# Patient Record
Sex: FEMALE | Race: WHITE | NOT HISPANIC OR LATINO | Employment: FULL TIME
[De-identification: names, ages, dates, MRNs, and addresses within clinical notes are randomized per-mention and may not be internally consistent; named-entity substitution may affect disease eponyms.]

---

## 2019-01-26 ENCOUNTER — TELEPHONE (OUTPATIENT)
Dept: SCHEDULING | Age: 49
End: 2019-01-26

## 2019-01-28 ENCOUNTER — IMAGING SERVICES (OUTPATIENT)
Dept: GENERAL RADIOLOGY | Age: 49
End: 2019-01-28

## 2019-01-28 PROCEDURE — X1094 NO CHARGE VISIT: HCPCS | Performed by: FAMILY MEDICINE

## 2019-02-05 PROBLEM — S52.551A CLOSED EXTRAARTICULAR FRACTURE OF DISTAL END OF RIGHT RADIUS: Status: ACTIVE | Noted: 2019-02-05

## 2019-03-01 PROBLEM — M25.631 LIMITATION OF JOINT MOTION OF WRIST, RIGHT: Status: ACTIVE | Noted: 2019-03-01

## 2021-06-21 ENCOUNTER — OFFICE VISIT (OUTPATIENT)
Dept: FAMILY MEDICINE CLINIC | Facility: CLINIC | Age: 51
End: 2021-06-21
Payer: COMMERCIAL

## 2021-06-21 VITALS
RESPIRATION RATE: 16 BRPM | HEIGHT: 67.5 IN | HEART RATE: 82 BPM | WEIGHT: 182 LBS | TEMPERATURE: 98 F | BODY MASS INDEX: 28.23 KG/M2 | SYSTOLIC BLOOD PRESSURE: 114 MMHG | DIASTOLIC BLOOD PRESSURE: 72 MMHG

## 2021-06-21 DIAGNOSIS — E55.9 VITAMIN D DEFICIENCY: ICD-10-CM

## 2021-06-21 DIAGNOSIS — Z00.00 ANNUAL PHYSICAL EXAM: Primary | ICD-10-CM

## 2021-06-21 DIAGNOSIS — Z12.31 BREAST CANCER SCREENING BY MAMMOGRAM: ICD-10-CM

## 2021-06-21 DIAGNOSIS — L60.8 CHANGE IN NAIL APPEARANCE: ICD-10-CM

## 2021-06-21 DIAGNOSIS — R21 RASH: ICD-10-CM

## 2021-06-21 DIAGNOSIS — Z87.42 HISTORY OF ABNORMAL CERVICAL PAP SMEAR: ICD-10-CM

## 2021-06-21 DIAGNOSIS — Z13.31 NEGATIVE DEPRESSION SCREENING: ICD-10-CM

## 2021-06-21 PROBLEM — S52.551A CLOSED EXTRAARTICULAR FRACTURE OF DISTAL END OF RIGHT RADIUS: Status: RESOLVED | Noted: 2019-02-05 | Resolved: 2021-06-21

## 2021-06-21 PROBLEM — M25.631 LIMITATION OF JOINT MOTION OF WRIST, RIGHT: Status: RESOLVED | Noted: 2019-03-01 | Resolved: 2021-06-21

## 2021-06-21 PROCEDURE — 3008F BODY MASS INDEX DOCD: CPT | Performed by: FAMILY MEDICINE

## 2021-06-21 PROCEDURE — 3074F SYST BP LT 130 MM HG: CPT | Performed by: FAMILY MEDICINE

## 2021-06-21 PROCEDURE — 99214 OFFICE O/P EST MOD 30 MIN: CPT | Performed by: FAMILY MEDICINE

## 2021-06-21 PROCEDURE — 99386 PREV VISIT NEW AGE 40-64: CPT | Performed by: FAMILY MEDICINE

## 2021-06-21 PROCEDURE — 3078F DIAST BP <80 MM HG: CPT | Performed by: FAMILY MEDICINE

## 2021-06-21 RX ORDER — PHENTERMINE HYDROCHLORIDE 37.5 MG/1
37.5 TABLET ORAL DAILY
COMMUNITY
Start: 2021-05-08

## 2021-06-21 NOTE — PROGRESS NOTES
Faiza Christy is a 52year old female that presents for annual physical exam.     Patient presents with:   Annual: routine physical, pend labs to [facility]. Due for TDAP vaccine  Gyn Exam: Dr. Sang Pierce does paps, 07/21/2021  Breast Problem: Lump o Number of children: Not on file      Years of education: Not on file      Highest education level: Not on file    Occupational History      Not on file    Tobacco Use      Smoking status: Never Smoker      Smokeless tobacco: Never Used    Substance and Sex 114/72   Pulse 82   Temp 98 °F (36.7 °C) (Temporal)   Resp 16   Ht 5' 7.5\" (1.715 m)   Wt 182 lb (82.6 kg)   LMP 06/07/2021   BMI 28.08 kg/m²  Estimated body mass index is 28.08 kg/m² as calculated from the following:    Height as of this encounter: 5' 7. Risks, benefits, and alternatives of current treatment plan discussed in detail. Questions and concerns addressed. Red flags to RTC or ED reviewed. Patient (or parent) agrees to plan.       Return in about 1 year (around 6/21/2022) for annual exam, or

## 2021-06-21 NOTE — PATIENT INSTRUCTIONS
Thank you for allowing me to participate in your care today. I will contact you with any results from today's visit. Lab results are typically available in 2-3 days for blood tests, and 3-5 days for any cultures or Paps.    Please let me know if you hav mammograms. 3    Cervical cancer All women in this age group, except women who have had a complete hysterectomy Pap test every 3 years or Pap test plus human papilloma virus (HPV) test every 5 years   Chlamydia Women at increased risk for infection [facility] Women at increased risk 1 to 3 doses   Influenza (flu) All women in this age group Once a year   Measles, mumps, rubella (MMR) All women in this age group who have no record of these infections or vaccines 1 or 2 doses   Meningococcal Women at increased ri

## 2021-07-13 LAB
ABSOLUTE BASOPHILS: 40 CELLS/UL (ref 0–200)
ABSOLUTE EOSINOPHILS: 110 CELLS/UL (ref 15–500)
ABSOLUTE LYMPHOCYTES: 1355 CELLS/UL (ref 850–3900)
ABSOLUTE MONOCYTES: 321 CELLS/UL (ref 200–950)
ABSOLUTE NEUTROPHILS: 2574 CELLS/UL (ref 1500–7800)
ALBUMIN/GLOBULIN RATIO: 1.8 (CALC) (ref 1–2.5)
ALBUMIN: 4.3 G/DL (ref 3.6–5.1)
ALKALINE PHOSPHATASE: 57 U/L (ref 31–125)
ALT: 20 U/L (ref 6–29)
AST: 23 U/L (ref 10–35)
BASOPHILS: 0.9 %
BILIRUBIN, TOTAL: 0.3 MG/DL (ref 0.2–1.2)
BUN: 9 MG/DL (ref 7–25)
CALCIUM: 9.5 MG/DL (ref 8.6–10.2)
CARBON DIOXIDE: 26 MMOL/L (ref 20–32)
CHLORIDE: 105 MMOL/L (ref 98–110)
CHOL/HDLC RATIO: 3 (CALC)
CHOLESTEROL, TOTAL: 195 MG/DL
CREATININE: 0.82 MG/DL (ref 0.5–1.1)
EGFR IF AFRICN AM: 98 ML/MIN/1.73M2
EGFR IF NONAFRICN AM: 85 ML/MIN/1.73M2
EOSINOPHILS: 2.5 %
GLOBULIN: 2.4 G/DL (CALC) (ref 1.9–3.7)
GLUCOSE: 89 MG/DL (ref 65–99)
HDL CHOLESTEROL: 65 MG/DL
HEMATOCRIT: 38.4 % (ref 35–45)
HEMOGLOBIN: 13 G/DL (ref 11.7–15.5)
LDL-CHOLESTEROL: 113 MG/DL (CALC)
LYMPHOCYTES: 30.8 %
MCH: 29.8 PG (ref 27–33)
MCHC: 33.9 G/DL (ref 32–36)
MCV: 88.1 FL (ref 80–100)
MONOCYTES: 7.3 %
MPV: 10.5 FL (ref 7.5–12.5)
NEUTROPHILS: 58.5 %
NON-HDL CHOLESTEROL: 130 MG/DL (CALC)
PLATELET COUNT: 254 THOUSAND/UL (ref 140–400)
POTASSIUM: 4.5 MMOL/L (ref 3.5–5.3)
PROTEIN, TOTAL: 6.7 G/DL (ref 6.1–8.1)
RDW: 12.5 % (ref 11–15)
RED BLOOD CELL COUNT: 4.36 MILLION/UL (ref 3.8–5.1)
SODIUM: 140 MMOL/L (ref 135–146)
TRIGLYCERIDES: 77 MG/DL
TSH W/REFLEX TO FT4: 1.51 MIU/L
WHITE BLOOD CELL COUNT: 4.4 THOUSAND/UL (ref 3.8–10.8)

## 2021-09-01 ENCOUNTER — WALK IN (OUTPATIENT)
Dept: URGENT CARE | Age: 51
End: 2021-09-01

## 2021-09-01 VITALS
DIASTOLIC BLOOD PRESSURE: 86 MMHG | RESPIRATION RATE: 18 BRPM | SYSTOLIC BLOOD PRESSURE: 131 MMHG | OXYGEN SATURATION: 97 % | HEART RATE: 62 BPM | TEMPERATURE: 97.3 F

## 2021-09-01 DIAGNOSIS — B35.0 TINEA CAPITIS: Primary | ICD-10-CM

## 2021-09-01 PROCEDURE — 99214 OFFICE O/P EST MOD 30 MIN: CPT | Performed by: EMERGENCY MEDICINE

## 2021-09-01 RX ORDER — FLUOCINONIDE TOPICAL SOLUTION USP, 0.05% 0.5 MG/ML
SOLUTION TOPICAL
COMMUNITY
Start: 2021-08-27 | End: 2021-10-15 | Stop reason: CLARIF

## 2021-09-01 RX ORDER — PHENTERMINE HYDROCHLORIDE 37.5 MG/1
37.5 TABLET ORAL DAILY
COMMUNITY
Start: 2021-05-08

## 2021-09-01 RX ORDER — UREA 20 %
CREAM (GRAM) TOPICAL
COMMUNITY
Start: 2021-07-30

## 2021-09-01 RX ORDER — FLUCONAZOLE 150 MG/1
TABLET ORAL
Qty: 8 TABLET | Refills: 0 | Status: SHIPPED | OUTPATIENT
Start: 2021-09-01

## 2021-09-01 RX ORDER — KETOCONAZOLE 20 MG/ML
SHAMPOO TOPICAL
COMMUNITY
Start: 2021-08-27

## 2021-09-01 ASSESSMENT — PAIN SCALES - GENERAL: PAINLEVEL: 0

## 2022-08-11 ENCOUNTER — TELEPHONE (OUTPATIENT)
Dept: SURGERY | Age: 52
End: 2022-08-11

## 2022-10-11 ENCOUNTER — APPOINTMENT (OUTPATIENT)
Dept: SURGERY | Age: 52
End: 2022-10-11

## 2023-01-10 ENCOUNTER — OFFICE VISIT (OUTPATIENT)
Dept: RHEUMATOLOGY | Facility: CLINIC | Age: 53
End: 2023-01-10
Payer: COMMERCIAL

## 2023-01-10 ENCOUNTER — LAB ENCOUNTER (OUTPATIENT)
Dept: LAB | Age: 53
End: 2023-01-10
Attending: INTERNAL MEDICINE
Payer: COMMERCIAL

## 2023-01-10 VITALS
WEIGHT: 138 LBS | DIASTOLIC BLOOD PRESSURE: 60 MMHG | TEMPERATURE: 98 F | BODY MASS INDEX: 21.66 KG/M2 | HEART RATE: 60 BPM | HEIGHT: 67 IN | RESPIRATION RATE: 16 BRPM | SYSTOLIC BLOOD PRESSURE: 108 MMHG

## 2023-01-10 DIAGNOSIS — G89.29 CHRONIC MIDLINE LOW BACK PAIN WITHOUT SCIATICA: ICD-10-CM

## 2023-01-10 DIAGNOSIS — R53.82 CHRONIC FATIGUE: ICD-10-CM

## 2023-01-10 DIAGNOSIS — M25.551 BILATERAL HIP PAIN: ICD-10-CM

## 2023-01-10 DIAGNOSIS — R21 SKIN RASH: Primary | ICD-10-CM

## 2023-01-10 DIAGNOSIS — M54.50 CHRONIC MIDLINE LOW BACK PAIN WITHOUT SCIATICA: ICD-10-CM

## 2023-01-10 DIAGNOSIS — I73.00 RAYNAUD'S DISEASE WITHOUT GANGRENE: ICD-10-CM

## 2023-01-10 DIAGNOSIS — M25.552 BILATERAL HIP PAIN: ICD-10-CM

## 2023-01-10 DIAGNOSIS — R21 SKIN RASH: ICD-10-CM

## 2023-01-10 DIAGNOSIS — R53.82 CHRONIC FATIGUE: Primary | ICD-10-CM

## 2023-01-10 LAB
IGA SERPL-MCNC: 160 MG/DL (ref 70–312)
IGE SERPL-ACNC: 129 IU/ML (ref 3.6–114)
IGM SERPL-MCNC: 87.2 MG/DL (ref 43–279)
IMMUNOGLOBULIN PNL SER-MCNC: 915 MG/DL (ref 791–1643)
VIT B12 SERPL-MCNC: 918 PG/ML (ref 193–986)

## 2023-01-10 PROCEDURE — 83516 IMMUNOASSAY NONANTIBODY: CPT

## 2023-01-10 PROCEDURE — 36415 COLL VENOUS BLD VENIPUNCTURE: CPT

## 2023-01-10 PROCEDURE — 88350 IMFLUOR EA ADDL 1ANTB STN PX: CPT

## 2023-01-10 PROCEDURE — 36415 COLL VENOUS BLD VENIPUNCTURE: CPT | Performed by: INTERNAL MEDICINE

## 2023-01-10 PROCEDURE — 82784 ASSAY IGA/IGD/IGG/IGM EACH: CPT | Performed by: INTERNAL MEDICINE

## 2023-01-10 PROCEDURE — 82785 ASSAY OF IGE: CPT | Performed by: INTERNAL MEDICINE

## 2023-01-10 PROCEDURE — 88346 IMFLUOR 1ST 1ANTB STAIN PX: CPT

## 2023-01-10 PROCEDURE — 3008F BODY MASS INDEX DOCD: CPT | Performed by: INTERNAL MEDICINE

## 2023-01-10 PROCEDURE — 3078F DIAST BP <80 MM HG: CPT | Performed by: INTERNAL MEDICINE

## 2023-01-10 PROCEDURE — 3074F SYST BP LT 130 MM HG: CPT | Performed by: INTERNAL MEDICINE

## 2023-01-10 PROCEDURE — 99245 OFF/OP CONSLTJ NEW/EST HI 55: CPT | Performed by: INTERNAL MEDICINE

## 2023-01-10 PROCEDURE — 82607 VITAMIN B-12: CPT | Performed by: INTERNAL MEDICINE

## 2023-01-10 RX ORDER — SODIUM SULFACETAMIDE AND SULFUR 80; 40 MG/ML; MG/ML
SOLUTION TOPICAL
COMMUNITY
Start: 2022-08-22

## 2023-01-10 RX ORDER — CICLOPIROX 1 G/100ML
SHAMPOO TOPICAL
COMMUNITY
Start: 2022-08-19

## 2023-01-10 RX ORDER — AZELAIC ACID 0.15 G/G
1 GEL TOPICAL NIGHTLY
COMMUNITY
Start: 2022-08-19

## 2023-01-10 RX ORDER — KETOCONAZOLE 20 MG/G
CREAM TOPICAL
COMMUNITY
Start: 2022-11-27

## 2023-01-10 RX ORDER — CLINDAMYCIN PHOSPHATE 11.9 MG/ML
SOLUTION TOPICAL 2 TIMES DAILY
COMMUNITY
Start: 2021-10-01

## 2023-01-10 RX ORDER — IVERMECTIN 10 MG/G
CREAM TOPICAL
COMMUNITY
Start: 2022-08-22

## 2023-01-11 NOTE — PROGRESS NOTES
Faxed office note to Dr. Aruna Hilton and her imaging orders to [facility] in [place] [address] on 1/11/2023

## 2023-01-24 ENCOUNTER — OFFICE VISIT (OUTPATIENT)
Dept: RHEUMATOLOGY | Facility: CLINIC | Age: 53
End: 2023-01-24
Payer: COMMERCIAL

## 2023-01-24 VITALS
WEIGHT: 140 LBS | RESPIRATION RATE: 16 BRPM | DIASTOLIC BLOOD PRESSURE: 66 MMHG | SYSTOLIC BLOOD PRESSURE: 102 MMHG | TEMPERATURE: 98 F | BODY MASS INDEX: 21.97 KG/M2 | OXYGEN SATURATION: 100 % | HEIGHT: 67 IN | HEART RATE: 62 BPM

## 2023-01-24 DIAGNOSIS — I73.00 RAYNAUD'S DISEASE WITHOUT GANGRENE: ICD-10-CM

## 2023-01-24 DIAGNOSIS — R76.0 ANTIPHOSPHOLIPID ANTIBODY POSITIVE: ICD-10-CM

## 2023-01-24 DIAGNOSIS — R21 SKIN RASH: Primary | ICD-10-CM

## 2023-01-24 DIAGNOSIS — R53.82 CHRONIC FATIGUE: ICD-10-CM

## 2023-01-24 PROCEDURE — 3008F BODY MASS INDEX DOCD: CPT | Performed by: INTERNAL MEDICINE

## 2023-01-24 PROCEDURE — 3074F SYST BP LT 130 MM HG: CPT | Performed by: INTERNAL MEDICINE

## 2023-01-24 PROCEDURE — 3078F DIAST BP <80 MM HG: CPT | Performed by: INTERNAL MEDICINE

## 2023-01-24 PROCEDURE — 99214 OFFICE O/P EST MOD 30 MIN: CPT | Performed by: INTERNAL MEDICINE

## 2023-01-30 ENCOUNTER — TELEPHONE (OUTPATIENT)
Dept: FAMILY MEDICINE CLINIC | Facility: CLINIC | Age: 53
End: 2023-01-30

## 2023-02-18 LAB
B2 GLYCOPROTEIN I (IGA)AB: <2 U/ML
B2 GLYCOPROTEIN I (IGG)AB: <2 U/ML
B2 GLYCOPROTEIN I(IGM)AB: <2 U/ML
CARDIOLIPIN AB (IGA): <2 APL-U/ML
CARDIOLIPIN AB (IGG): <2 GPL-U/ML
CARDIOLIPIN AB (IGM): <2 MPL-U/ML
DRVVT SCREEN: 31 SEC
PTT-LA SCREEN: 30 SEC

## (undated) NOTE — Clinical Note
Hi, Dr. Annel Solorio. Thank you for referring Ms. Bushra Colmenares for rheumatologic evaluation. Please see the discussion portion of my note and let me know if you have any questions.    Juan Carlos Alves, DO [facility] 1/11/2023

## (undated) NOTE — Clinical Note
Please fax copy of note to dr. Shawn Rapp. Also fax orders for imaging to [address]- confirm with pt location she would like to get done at.   Farhan Swan DO [facility] 1/11/2023

## (undated) NOTE — Clinical Note
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 Skylar Lee.    Luis Ayon, DO [facility] 1/25/2023